# Patient Record
Sex: FEMALE | Race: WHITE | Employment: OTHER | URBAN - METROPOLITAN AREA
[De-identification: names, ages, dates, MRNs, and addresses within clinical notes are randomized per-mention and may not be internally consistent; named-entity substitution may affect disease eponyms.]

---

## 2019-01-21 PROBLEM — N64.4 BREAST PAIN: Status: ACTIVE | Noted: 2017-03-14

## 2019-01-21 PROBLEM — Z01.419 ENCOUNTER FOR ROUTINE GYNECOLOGICAL EXAMINATION: Status: ACTIVE | Noted: 2017-08-03

## 2019-01-24 PROBLEM — N92.6 MISSED MENSES: Status: ACTIVE | Noted: 2019-01-24

## 2019-02-04 PROBLEM — O20.9 FIRST TRIMESTER BLEEDING: Status: ACTIVE | Noted: 2019-02-04

## 2019-02-04 PROBLEM — N92.6 MISSED MENSES: Status: RESOLVED | Noted: 2019-01-24 | Resolved: 2019-02-04

## 2019-02-22 PROBLEM — Z01.419 ENCOUNTER FOR ROUTINE GYNECOLOGICAL EXAMINATION: Status: RESOLVED | Noted: 2017-08-03 | Resolved: 2019-02-22

## 2019-03-08 PROBLEM — Z48.89 AFTERCARE FOLLOWING SURGERY: Status: ACTIVE | Noted: 2019-03-08

## 2019-05-08 PROBLEM — Z34.80 SUPERVISION OF OTHER NORMAL PREGNANCY: Status: ACTIVE | Noted: 2019-05-08

## 2019-05-08 PROBLEM — Z48.89 AFTERCARE FOLLOWING SURGERY: Status: RESOLVED | Noted: 2019-03-08 | Resolved: 2019-05-08

## 2019-06-04 PROBLEM — O20.9 FIRST TRIMESTER BLEEDING: Status: RESOLVED | Noted: 2019-02-04 | Resolved: 2019-06-04

## 2019-08-07 PROBLEM — F41.9 ANXIETY DISORDER: Status: ACTIVE | Noted: 2019-08-07

## 2019-08-07 PROBLEM — F33.2 MAJOR DEPRESSIVE DISORDER, RECURRENT EPISODE, SEVERE (HCC): Status: ACTIVE | Noted: 2019-08-07

## 2019-08-09 PROBLEM — R11.2 NAUSEA & VOMITING: Status: ACTIVE | Noted: 2019-08-09

## 2019-08-10 PROBLEM — F41.1 GENERALIZED ANXIETY DISORDER: Status: ACTIVE | Noted: 2019-08-10

## 2019-08-10 PROBLEM — K59.09 OTHER CONSTIPATION: Status: ACTIVE | Noted: 2019-08-10

## 2019-08-10 PROBLEM — F43.10 PTSD (POST-TRAUMATIC STRESS DISORDER): Status: ACTIVE | Noted: 2019-08-07

## 2019-08-29 PROBLEM — F33.2 MAJOR DEPRESSIVE DISORDER, RECURRENT SEVERE WITHOUT PSYCHOTIC FEATURES (HCC): Status: ACTIVE | Noted: 2019-08-29

## 2019-09-23 PROBLEM — F33.1 MAJOR DEPRESSIVE DISORDER, RECURRENT, MODERATE (HCC): Status: ACTIVE | Noted: 2019-08-29

## 2019-12-31 PROBLEM — F33.1 MAJOR DEPRESSIVE DISORDER, RECURRENT, MODERATE (HCC): Status: RESOLVED | Noted: 2019-08-29 | Resolved: 2019-12-31

## 2019-12-31 PROBLEM — N64.4 BREAST PAIN: Status: RESOLVED | Noted: 2017-03-14 | Resolved: 2019-12-31

## 2020-01-01 PROBLEM — Z37.9 NORMAL LABOR: Status: ACTIVE | Noted: 2020-01-01

## 2020-01-01 PROBLEM — Z34.80 SUPERVISION OF OTHER NORMAL PREGNANCY: Status: RESOLVED | Noted: 2019-05-08 | Resolved: 2020-01-01

## 2020-01-01 PROBLEM — Z37.9 NORMAL LABOR: Status: RESOLVED | Noted: 2020-01-01 | Resolved: 2020-01-01

## 2022-01-20 ENCOUNTER — TELEPHONE (OUTPATIENT)
Dept: OTHER | Age: 32
End: 2022-01-20

## 2022-01-20 NOTE — TELEPHONE ENCOUNTER
Pt name and  verified. Who is your current or most recent primary care provider and   which hospital were they affiliated with? Dr. Carlito Easley    How long did you see them for? Entire Life until Provider stopped practicing    Is there a reason that you are looking for a new provider? Needs PCP    Would you like to provide your e-mail to sign up for out My Chart system? Already active  The benefits of being a My Chart member are:    -Request medical appointments  -View your health summary from the My Chart electronic health record. -View test results.  -Request prescription renewals.  -Access trusted health information resources.  -Communicate electronically and securely with your medical care team.    Would you like a test message or letter with your activation code? n/a    Do you have a preference about the provider you will see? MD/DO specializing in OMT also    When was your last Annual or Well Child Exam? Unsure, maybe 2 years - has seen OBGYN's for children however    Do you have any ongoing or chronic conditions you are currently being treated   for (e.g. Hypertension, Diabetes, Depression, etc)? If yes list: PTSD, Chronic Anxiety Disorder, Major Depressive Disorder, Pelvic Floor PT    Have you been seen by a provider for these chronic conditions in the last 6 months? If so, who? Med Manager Psych[facility] - Shikha Solorio NP  Counselor - Ananth Cotter PT for Pelvic Floor  (Sees [facility]'s OBGYN and Ortho also)    Do you need an appointment to establish care or is there something more   urgent you need to be seen for? Explain: Concerned about refilling Asthma Inhaler    Are you on any medicines that require a special prescription such as a sleeping pills, pain medication or stimulants? (if no go ahead and book appointment, if yes please read the following: All of current meds have been dealt with by med manager, or Ortho  Notes she has her inhaler to worry about.  Tends to have a Flexeril PRN basis script. \"Please be aware that our provider's may not prescribe these medications at the first visit as they need to get to know more about you and your medical problems/history before they will safely prescribe these\" Inforemd? Yes or No yes    Do you have any questions about what we discussed? No  (If no go ahead and book, if yes please get the details and send to the practice)    Your new patient appointment is booked on TBD PT TRANSFERRED TO [address]    **Thank you for choosing us for your health care needs. Ms. Anthony Umana do want to let you know that you will need to contact your insurance company and make them aware of your new Primary Care Provider which is (Pt would like Livingston Callow DO)  Also Ms. Anthony Banerjees a new patient packet will be coming to you. Would you like to have this mailed or faxed to you? Mailed If faxed what is the number? mailed  We would greatly appreciate it if you could complete the release of records and either mail back or drop off at out office within 7 days of receipt.     CB#: [phone] (home)     Carmina Garces

## 2022-02-10 PROBLEM — K59.09 OTHER CONSTIPATION: Status: RESOLVED | Noted: 2019-08-10 | Resolved: 2022-02-10

## 2022-02-10 PROBLEM — Z86.19 HISTORY OF HELICOBACTER PYLORI INFECTION: Status: ACTIVE | Noted: 2022-02-10

## 2022-03-29 PROBLEM — M54.81 BILATERAL OCCIPITAL NEURALGIA: Status: ACTIVE | Noted: 2022-03-29

## 2022-03-29 PROBLEM — M79.10 MYALGIA: Status: ACTIVE | Noted: 2022-03-29

## 2022-04-07 PROBLEM — M79.10 MYALGIA: Status: RESOLVED | Noted: 2022-03-29 | Resolved: 2022-04-07
